# Patient Record
Sex: MALE | Race: WHITE | Employment: OTHER | URBAN - METROPOLITAN AREA
[De-identification: names, ages, dates, MRNs, and addresses within clinical notes are randomized per-mention and may not be internally consistent; named-entity substitution may affect disease eponyms.]

---
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
## 2017-03-16 LAB
ALBUMIN SERPL-MCNC: NORMAL G/DL
ALP BLD-CCNC: NORMAL U/L
ALT SERPL-CCNC: NORMAL U/L
ANION GAP SERPL CALCULATED.3IONS-SCNC: NORMAL MMOL/L
AST SERPL-CCNC: NORMAL U/L
BILIRUB SERPL-MCNC: NORMAL MG/DL
BUN BLDV-MCNC: NORMAL MG/DL
CALCIUM SERPL-MCNC: NORMAL MG/DL
CHLORIDE BLD-SCNC: NORMAL MMOL/L
CHOLESTEROL, TOTAL: NORMAL
CHOLESTEROL/HDL RATIO: NORMAL
CO2: NORMAL
CREAT SERPL-MCNC: NORMAL MG/DL
GFR CALCULATED: NORMAL
GLUCOSE BLD-MCNC: NORMAL MG/DL
HDLC SERPL-MCNC: NORMAL MG/DL
LDL CHOLESTEROL CALCULATED: NORMAL
POTASSIUM SERPL-SCNC: NORMAL MMOL/L
SODIUM BLD-SCNC: NORMAL MMOL/L
TOTAL PROTEIN: NORMAL
TRIGL SERPL-MCNC: NORMAL MG/DL
VLDLC SERPL CALC-MCNC: NORMAL MG/DL

## 2019-05-13 RX ORDER — ESCITALOPRAM OXALATE 5 MG/1
5 TABLET ORAL
Refills: 0 | COMMUNITY
Start: 2019-04-13 | End: 2019-05-13 | Stop reason: SDUPTHER

## 2019-05-15 RX ORDER — ESCITALOPRAM OXALATE 5 MG/1
5 TABLET ORAL DAILY
Qty: 30 TABLET | Refills: 5 | Status: SHIPPED | OUTPATIENT
Start: 2019-05-15 | End: 2019-05-23 | Stop reason: SDUPTHER

## 2019-05-23 ENCOUNTER — OFFICE VISIT (OUTPATIENT)
Dept: PRIMARY CARE CLINIC | Age: 24
End: 2019-05-23
Payer: COMMERCIAL

## 2019-05-23 ENCOUNTER — TELEPHONE (OUTPATIENT)
Dept: PRIMARY CARE CLINIC | Age: 24
End: 2019-05-23

## 2019-05-23 VITALS
BODY MASS INDEX: 25.06 KG/M2 | HEIGHT: 71 IN | SYSTOLIC BLOOD PRESSURE: 126 MMHG | TEMPERATURE: 98.2 F | DIASTOLIC BLOOD PRESSURE: 78 MMHG | WEIGHT: 179 LBS

## 2019-05-23 DIAGNOSIS — F41.9 ANXIETY: ICD-10-CM

## 2019-05-23 DIAGNOSIS — Z00.01 ENCOUNTER FOR ANNUAL GENERAL MEDICAL EXAMINATION WITH ABNORMAL FINDINGS IN ADULT: Primary | ICD-10-CM

## 2019-05-23 PROCEDURE — 99395 PREV VISIT EST AGE 18-39: CPT | Performed by: FAMILY MEDICINE

## 2019-05-23 RX ORDER — ESCITALOPRAM OXALATE 5 MG/1
5 TABLET ORAL DAILY
Qty: 90 TABLET | Refills: 5 | Status: SHIPPED
Start: 2019-05-23 | End: 2020-03-20

## 2019-05-23 RX ORDER — ESCITALOPRAM OXALATE 5 MG
5 TABLET ORAL DAILY
Qty: 90 TABLET | Refills: 12 | Status: SHIPPED
Start: 2019-05-23 | End: 2020-03-20

## 2019-05-23 ASSESSMENT — ENCOUNTER SYMPTOMS
EYES NEGATIVE: 1
RESPIRATORY NEGATIVE: 1
ALLERGIC/IMMUNOLOGIC NEGATIVE: 1
GASTROINTESTINAL NEGATIVE: 1

## 2019-05-23 NOTE — TELEPHONE ENCOUNTER
Pharmacy called script for Macarena Pompa was sent over today. They are asking if you ment to send GIGI? If so pts copay is $981    escitalopram was sent over on 5/15/19 and recently picked up.     Please advise

## 2019-05-23 NOTE — PROGRESS NOTES
19  Name: Loy Councilman    : 1995    Sex: male    Age: 21 y.o. Subjective:  Chief Complaint: Patient is here for anxiety     Feel ok   Much   Better   With med  Seeing   Counselor nettie dyer    Taking  [employer] job  Will be licnesed soon----  No cp or sob     Ros neg------ not drinking any etoh   No  GF now        Review of Systems   Constitutional: Negative. HENT: Negative. Eyes: Negative. Respiratory: Negative. Cardiovascular: Negative. Gastrointestinal: Negative. Endocrine: Negative. Genitourinary: Negative. Musculoskeletal: Negative. Skin: Negative. Allergic/Immunologic: Negative. Neurological: Negative. Hematological: Negative. Psychiatric/Behavioral: Negative.           Current Outpatient Medications:     LEXAPRO 5 MG tablet, Take 1 tablet by mouth daily, Disp: 90 tablet, Rfl: 12    escitalopram (LEXAPRO) 5 MG tablet, Take 1 tablet by mouth daily 1 po qd, Disp: 30 tablet, Rfl: 5  Allergies not on file  Social History     Socioeconomic History    Marital status: Single     Spouse name: Not on file    Number of children: Not on file    Years of education: Not on file    Highest education level: Not on file   Occupational History    Not on file   Social Needs    Financial resource strain: Not on file    Food insecurity:     Worry: Not on file     Inability: Not on file    Transportation needs:     Medical: Not on file     Non-medical: Not on file   Tobacco Use    Smoking status: Not on file   Substance and Sexual Activity    Alcohol use: Not on file    Drug use: Not on file    Sexual activity: Not on file   Lifestyle    Physical activity:     Days per week: Not on file     Minutes per session: Not on file    Stress: Not on file   Relationships    Social connections:     Talks on phone: Not on file     Gets together: Not on file     Attends Temple service: Not on file     Active member of club or organization: Not on file Attends meetings of clubs or organizations: Not on file     Relationship status: Not on file    Intimate partner violence:     Fear of current or ex partner: Not on file     Emotionally abused: Not on file     Physically abused: Not on file     Forced sexual activity: Not on file   Other Topics Concern    Not on file   Social History Narrative        CC: HERE FOR STRESS DERPESSION    HPI: GF [id number]---- BROKE OFF AGAIN--- ALMOS DONE    WTH TRAIN ING---- HE CALLED FOR THERPAIST AND SAW ONE FEW D AGO MALE    HELPED KATHLEEN---GAVE SOME STUFF OT DO--- SEES BACK IN OEN MO-----VINCE [address] FOR [employer] BUSINESS---REAL ESTATE APPRAISAL    PE FOR SCHOOL 2012 [address] [place] S AB POS IN 2015    ELEV LFT 7-15 DUE TO ETOH    ANX WITH GF BREAK UP    11-18    COUNSELOR AND  START LEXAPRO      No past medical history on file. No family history on file. No past surgical history on file. Vitals:    05/23/19 0724   BP: 126/78   Temp: 98.2 °F (36.8 °C)   Weight: 179 lb (81.2 kg)   Height: 5' 11\" (1.803 m)       Objective:    Physical Exam   Constitutional: He is oriented to person, place, and time. He appears well-developed and well-nourished. HENT:   Head: Normocephalic. Eyes: Pupils are equal, round, and reactive to light. EOM are normal.   Neck: Normal range of motion. Cardiovascular: Normal rate and regular rhythm. Pulmonary/Chest: Effort normal and breath sounds normal.   Abdominal: Soft. Musculoskeletal: Normal range of motion. Neurological: He is alert and oriented to person, place, and time. Skin: Skin is warm. Psychiatric: He has a normal mood and affect. His behavior is normal.   Vitals reviewed. Diagnoses and all orders for this visit:    Encounter for annual general medical examination with abnormal findings in adult    Anxiety  -     LEXAPRO 5 MG tablet;  Take 1 tablet by mouth daily        Comments: refill  Med  FU with psych  Hs asked about  RF   Told to  Caution       Ros neg    A great deal of time spent reviewing medications, diet, exercise, social issues. Also reviewing the chart before entering the room with patient and finishing charting after leaving patient's room. More than half of that time was spent face to face with the patient in counseling and coordinating care. Follow Up: Return if symptoms worsen or fail to improve.      Seen by:  Ryan Phelps, DO

## 2020-03-16 ENCOUNTER — OFFICE VISIT (OUTPATIENT)
Dept: PRIMARY CARE CLINIC | Age: 25
End: 2020-03-16
Payer: COMMERCIAL

## 2020-03-16 VITALS
DIASTOLIC BLOOD PRESSURE: 78 MMHG | WEIGHT: 190 LBS | HEART RATE: 87 BPM | SYSTOLIC BLOOD PRESSURE: 126 MMHG | BODY MASS INDEX: 26.5 KG/M2 | TEMPERATURE: 98.4 F | OXYGEN SATURATION: 97 %

## 2020-03-16 LAB — S PYO AG THROAT QL: NORMAL

## 2020-03-16 PROCEDURE — 87880 STREP A ASSAY W/OPTIC: CPT | Performed by: FAMILY MEDICINE

## 2020-03-16 PROCEDURE — 99213 OFFICE O/P EST LOW 20 MIN: CPT | Performed by: FAMILY MEDICINE

## 2020-03-16 RX ORDER — AMOXICILLIN 500 MG/1
500 CAPSULE ORAL 3 TIMES DAILY
Qty: 30 CAPSULE | Refills: 0 | Status: SHIPPED
Start: 2020-03-16 | End: 2022-08-10 | Stop reason: ALTCHOICE

## 2020-03-16 ASSESSMENT — PATIENT HEALTH QUESTIONNAIRE - PHQ9
2. FEELING DOWN, DEPRESSED OR HOPELESS: 0
SUM OF ALL RESPONSES TO PHQ QUESTIONS 1-9: 0
SUM OF ALL RESPONSES TO PHQ9 QUESTIONS 1 & 2: 0
SUM OF ALL RESPONSES TO PHQ QUESTIONS 1-9: 0
1. LITTLE INTEREST OR PLEASURE IN DOING THINGS: 0

## 2020-03-16 ASSESSMENT — ENCOUNTER SYMPTOMS
SINUS PRESSURE: 1
TROUBLE SWALLOWING: 0
RESPIRATORY NEGATIVE: 1
SORE THROAT: 0
SINUS PAIN: 1

## 2020-03-20 ENCOUNTER — HOSPITAL ENCOUNTER (OUTPATIENT)
Dept: PSYCHIATRY | Age: 25
Setting detail: THERAPIES SERIES
Discharge: HOME | End: 2020-03-20
Payer: COMMERCIAL

## 2020-03-20 PROCEDURE — 90791 PSYCH DIAGNOSTIC EVALUATION: CPT | Performed by: COUNSELOR

## 2020-03-20 ASSESSMENT — LIFESTYLE VARIABLES: HISTORY_ALCOHOL_USE: YES

## 2020-03-25 ENCOUNTER — HOSPITAL ENCOUNTER (OUTPATIENT)
Dept: PSYCHIATRY | Age: 25
Setting detail: THERAPIES SERIES
Discharge: HOME | End: 2020-03-25
Payer: COMMERCIAL

## 2020-03-25 NOTE — VIRTUAL HEALTH
Client Location: Client's home     [address] [place] [address] [address]    Provider Location (City/State): [place]     This virtual visit was conducted via interactive/real-time audio/video. Placed phone call to client for scheduled 9:30am counseling session. Client was pleasant and participated fully in session. Client discussed history of struggles with alcohol use/drug use and the situation that occurred that prompted engagement in our program. Client does not report hx of inpt/out AOD tx. Client reports that he has a history of misuse - primarily alcohol - with times of sobriety/non problematic use and then escalation of use. He reports the most recent drinking event ended in an arrest and is now linked with [facility] who is ordering him to engage in treatment with possibility of charges being expunged. Client reports that he also has a hx of anxiety and has had medication for this in the past but does not with to be prescribed anxiety medication. He feels he does not need medication and would like to get to the root of the problem as to why he would drink excessively. In addition to the anxiety reports, he also reports stressors of not having a career that he feels passionate about. He reports that often times when he would begin drinking, he would tell himself he has nothing to lose and would continue. 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 He reports that working helps him deal with idle time that would create thoughts of using. He reports that he graduated with a degree in [address] [place], [place] [id number] and had hopes to become a writer. He graduated in 2016 from college. Encouraged him to reach out to mentors/previous professors to see if they have any recommendations of how to get started. Cl reports that he has a strong support system that is positive and supportive of him during this time.  He reports that his mother's father was an alcoholic and she is aware of these situations. He reports his family, friends, and girlfriend are all people that he can talk to if needed. Cl reports that he is not familiar with recovery meetings in our community. Educated him on the meetings and the benefit and reported that meetings are being offered virtually. Encouraged cl to look into this resource. Will e-mail him the resource list to reported e-mail: [email or web address]. Client has a goal to complete treatment and \"figure out why I drink like this\".      Client's next session is scheduled for 3/31/2020 @ 1:00 pm    Electronically signed by Edda Chopra on 3/25/2020 at 10:36 AM

## 2020-04-03 ENCOUNTER — HOSPITAL ENCOUNTER (OUTPATIENT)
Dept: PSYCHIATRY | Age: 25
Setting detail: THERAPIES SERIES
Discharge: HOME | End: 2020-04-03
Payer: COMMERCIAL

## 2020-04-03 PROCEDURE — 99203 OFFICE O/P NEW LOW 30 MIN: CPT | Performed by: PSYCHIATRY & NEUROLOGY

## 2020-04-03 NOTE — BH NOTE
graduated college from [employer]. 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 MENTAL STATUS EXAM: White male appears age. Pleasant, cooperative, forthcoming. Normal psychomotor activity, gait, strength, tone, eye contact. Mood euthymic. Affect flexible. Speech clear. Thoughts organized. Content future-oriented. No suicidal or homicidal ideations. No paranoia, delusions or hallucinations. Orientation, concentration, recent and remote memory are grossly intact. Fund of knowledge fair. Language use fair. Insight and judgment fair. URINE TOXICOLOGY RESULTS: Not done    MEDICATIONS:  None     ASSESSMENT:  Alcohol Use Disorder moderate     Anxiety Disorder unspecified    PLAN: Admit to [facility]. Treatment will include group therapy, relapse prevention, case management and pharmacotherapy. No indication to start any medication at this time. OARRS report reviewed. Discuss with team. Speak with patient again in 2-3 weeks.     Signed:  Electronically signed by Juan Antony MD on 4/3/2020 at 1:20 PM

## 2020-04-07 ENCOUNTER — HOSPITAL ENCOUNTER (OUTPATIENT)
Dept: PSYCHIATRY | Age: 25
Setting detail: THERAPIES SERIES
Discharge: HOME | End: 2020-04-07
Payer: COMMERCIAL

## 2020-04-07 NOTE — VIRTUAL HEALTH
Consults  Patient Location: Pt's home address listed in chart  [address]    Provider Location ([facility]/State): [place], [place]    This virtual visit was conducted via interactive/real-time audio/video. SW spoke with pt over the phone for check-in. Pt reported he is \"doing good\" and has had \"no urges to drink\". Pt reported that he is keeping himself busy by working two jobs and only having 1 day off per week. Encouraged pt to participate in self-care activities and allow time for himself. Pt inquired about meeting program requirements/[facility] requirements due to limited treatment due to Deepaaj. SW unable to provide pt with appropriate answer. Pt gave sw permission to speak with his [facility]  Jose Luis Melgar [phone]. [facility] for Danay to return phone call to ensure pt is engaging in treatment plan. SW will contact pt once information is received to follow-up.

## 2020-04-08 ENCOUNTER — HOSPITAL ENCOUNTER (OUTPATIENT)
Dept: PSYCHIATRY | Age: 25
Setting detail: THERAPIES SERIES
Discharge: HOME | End: 2020-04-08
Payer: COMMERCIAL

## 2020-04-15 ENCOUNTER — HOSPITAL ENCOUNTER (OUTPATIENT)
Dept: PSYCHIATRY | Age: 25
Setting detail: THERAPIES SERIES
Discharge: HOME | End: 2020-04-15
Payer: COMMERCIAL

## 2020-04-15 NOTE — VIRTUAL HEALTH
Patient Location: Pt home     Provider Location ([facility]/State): [facility]     This virtual visit was conducted via interactive/real-time audio/video. Pt has verbally consented to tele health sessions. Date: 4/15/2020  Start time: 1:00pm  End time: 1:15pm     Patient's Goal: \"figure out why I drink like this\"    Session Notes: Spoke to Christiana Jay for scheduled 1pm session. Christiana Jay reports doing well overall. He shared his current activities that he engages in to deter him from use. He reports that he is working, hangs out with his girlfriend, visits his family, goes for walks, reads and writes. 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 Christiana Jay denies any triggers or urges to use and reports decrease in anxiety sx. Christiana Jay did share that he is in the process of finding out whether or not he will be ordered to drug court to complete the program and get charges expunged. Christiana Jay is motivated and is willing to do what he needs to do to complete the program and begin promoting a healthier lifestyle. Christiana Jay is scheduled for a telehealth session next week 4.22 at 1pm    Status After Intervention:  Improved    Participation Level: Active Listener and Interactive    Participation Quality: Appropriate, Attentive and Sharing    Speech:  Normal rate and tone    Thought Process/Content: Logical    Level of consciousness:  Alert and Oriented x4    Response to Learning: Able to verbalize current knowledge/experience, Able to retain information and Capable of insight    Endings: None Reported    Discipline Responsible: /Counselor     Session  15 min.  or less Non billable

## 2020-04-22 ENCOUNTER — HOSPITAL ENCOUNTER (OUTPATIENT)
Dept: PSYCHIATRY | Age: 25
Setting detail: THERAPIES SERIES
Discharge: HOME | End: 2020-04-22
Payer: COMMERCIAL

## 2020-04-29 ENCOUNTER — HOSPITAL ENCOUNTER (OUTPATIENT)
Dept: PSYCHIATRY | Age: 25
Setting detail: THERAPIES SERIES
Discharge: HOME | End: 2020-04-29
Payer: COMMERCIAL

## 2020-04-29 NOTE — PLAN OF CARE
[facility]- Level of Care Placement      [x]Admissions  []Continued Stay []Discharge/Transfer / Complication in [place] Date: 03/20/2020    Hanh Sethi      Level of Care Level 1      Outpatient Services Level 2.1   Intensive Outpatient Services(IOP) Level 2.5   Partial Hospitalization Services Level 3.1 CLINICALLY Managed Low-Intensity Residential Services Level 3.3  CLINICALLY Managed Population- Specific High- Intensity Residential Services Level 3.5  CLINICALLY Managed High Intensive Residential Services Level 3.7  MEDICALLY Monitored Intensive Inpatient Services Level 4  MEDICALLY Managed Intensive Inpatient Services   Dimension 1  Acute Intoxication and/or Withdrawal Potential [x] Not experiencing significant withdrawal    [] Minimal  risk of severe  withdrawal [] Minimal  risk of severe  withdrawal    [] Manageable  at Level 2-WM [] Moderate  risk of severe withdrawal    [] Manageable at Level 2-WM [] No withdrawal risk or minimal or stable withdrawal     [] Concurrently receiving Level -WM or Level 2-WM services [] Minimal risk of severe withdrawal    [] If withdrawal is present, manageable at Level 3. 2-WM  [] Minimal risk of severe withdrawal    [] If withdrawal is present manageable at Level 3. 2-WM [] High risk of withdrawal, but manageable at Level  3.7-WM and does not require  full resources of a licensed hospital [] At high risk of withdrawal and requires Level 4-WM and full resources of licensed hospital    COMMENTS:           Dimension 2   Biomedical Conditions and Complications  (BMC/C) [x] None or very stable    [] Receiving concurrent medical monitoring  [] None or not a distraction from treatment    [] Problems are manageable at Level 2.1 [] None or not sufficient to distract from treatment    [] Problems are manageable at  Level 2.5 [] None or stable    [] Receiving concurrent medical monitoring  [] None or stable    [] Receiving concurrent medical Osmin      Level of Care Level 1  Outpatient   Services Level 2.1  Intensive  Outpatient  Services Level 2.5  Partial Hospitalization Services Level 3.1  CLINICALLY Managed Low-intensity Residential Services Level 3.3  CLINICALLY Managed Population- Specific High- Intensity Residential Services Level 3.5  CLINICALLY Managed High-Intensive Residential Services Level 3.7  MEDICALLY Monitored Intensive Inpatient Services Level 4  MEDICALLY Managed Intensive Inpatient Services   Dimension 4  Readiness  To  Change [] Ready for recovery but needs motivating and monitoring strategies to strengthen readiness    []Needs ongoing monitoring and disease management    [x] High severity in this dimension but not in other dimensions. Needs Level 1 motivational enhancement strategies   [] Has variable engagement in treatment, ambivalence, or lack of awareness of substance use or mental health problems and requires structured program several times/wk. to promote progress through stages of change [] Has poor engagement in treatment, significant ambivalence, or lack of awareness of substance use or mental health problems, requires near daily structured program or intensive engagement to promote progress through stages of change [] Open to recovery, but needs structured environment to maintain therapeutic gains [] Has little awareness & needs interventions at Level 3.3 to engage & stay in treatment.     [] If there is high severity in this dimension, but not in any other dimension, motivational enhancement strategies should be provided in Level 1 [] Has marked difficulty with, or opposition to treatment with dangerous consequences    [] If there is high severity in this dimension, but not in any other dimension, motivational enhancement strategies should be provided in Level 1 [] Low interest in treatment and impulse control is poor despite negative consequences;  needs motivating strategies only safely available in 24-hour structured is not supportive  but with structure and support, the client can cope [] Recovery environment is not supportive, but with structure and support and relief from the home environment, client can cope [] Environment    is dangerous, but recovery is achievable if Level 3.1 24-hour structure is available  [] Environment is dangerous and  client needs 24-hour structure to learn to cope [] Environment is dangerous, and the client lacks skills to cope outside of a  highly structured 24-hour setting   [] Environment is dangerous, and the client lacks skills to cope outside of a  highly structured 24-hour setting [] Problems in this dimension do not qualify the client for Level 4 services    [] If the client's only severity is in Dimension 4,5, and/or 6 without high severity in Dimensions 1,2, and/or 3, then client is not qualified for Level 4   COMMENTS:             Staff: Electronically signed by Lilia Treviño, [address]

## 2020-05-06 ENCOUNTER — HOSPITAL ENCOUNTER (OUTPATIENT)
Dept: PSYCHIATRY | Age: 25
Setting detail: THERAPIES SERIES
Discharge: HOME | End: 2020-05-06
Payer: COMMERCIAL

## 2020-05-06 ASSESSMENT — PATIENT HEALTH QUESTIONNAIRE - PHQ9: SUM OF ALL RESPONSES TO PHQ QUESTIONS 1-9: 0

## 2020-05-08 ENCOUNTER — HOSPITAL ENCOUNTER (OUTPATIENT)
Dept: PSYCHIATRY | Age: 25
Setting detail: THERAPIES SERIES
Discharge: HOME | End: 2020-05-08
Payer: COMMERCIAL

## 2020-05-08 NOTE — BH NOTE
PSYCHIATRY ATTENDING NOTE    CC: \"Really good. \"    S: Patient being seen at [facility] in follow-up for alcohol use and anxiety. Spoke with patient over telephone today due to coronavirus precautions. Ashley Garcia continues to report he is doing very well. No further alcohol use. He is comfortable with discharge. No issues identified. MSE: Pleasant, cooperative, forthcoming. Mood euthymic. Speech clear. Thoughts organized. Content future-oriented. No suicidal or homicidal ideations. No paranoia, delusions or hallucinations. Orientation, concentration, recent and remote memory are grossly intact. Fund of knowledge fair. Language use fair. Insight and judgment fair. MEDICATIONS: None    ASSESSMENT:  Alcohol Use Disorder moderate                                      Anxiety Disorder unspecified    PLAN: Discharge from [facility].                           Electronically signed by Bandar Stevens MD on 5/8/2020 at 11:55 AM

## 2020-06-01 ENCOUNTER — SOCIAL WORK (OUTPATIENT)
Dept: PSYCHIATRY | Age: 25
End: 2020-06-01

## 2020-08-26 ENCOUNTER — OFFICE VISIT (OUTPATIENT)
Dept: PRIMARY CARE CLINIC | Age: 25
End: 2020-08-26
Payer: COMMERCIAL

## 2020-08-26 VITALS
BODY MASS INDEX: 28 KG/M2 | TEMPERATURE: 98.4 F | DIASTOLIC BLOOD PRESSURE: 78 MMHG | HEIGHT: 71 IN | WEIGHT: 200 LBS | SYSTOLIC BLOOD PRESSURE: 126 MMHG

## 2020-08-26 PROCEDURE — 99213 OFFICE O/P EST LOW 20 MIN: CPT | Performed by: FAMILY MEDICINE

## 2020-08-26 PROCEDURE — 1036F TOBACCO NON-USER: CPT | Performed by: FAMILY MEDICINE

## 2020-08-26 PROCEDURE — G8419 CALC BMI OUT NRM PARAM NOF/U: HCPCS | Performed by: FAMILY MEDICINE

## 2020-08-26 PROCEDURE — G8428 CUR MEDS NOT DOCUMENT: HCPCS | Performed by: FAMILY MEDICINE

## 2020-08-26 ASSESSMENT — ENCOUNTER SYMPTOMS
GASTROINTESTINAL NEGATIVE: 1
ALLERGIC/IMMUNOLOGIC NEGATIVE: 1
RESPIRATORY NEGATIVE: 1
EYES NEGATIVE: 1

## 2020-08-26 ASSESSMENT — PATIENT HEALTH QUESTIONNAIRE - PHQ9
SUM OF ALL RESPONSES TO PHQ QUESTIONS 1-9: 0
SUM OF ALL RESPONSES TO PHQ QUESTIONS 1-9: 0
2. FEELING DOWN, DEPRESSED OR HOPELESS: 0
1. LITTLE INTEREST OR PLEASURE IN DOING THINGS: 0
SUM OF ALL RESPONSES TO PHQ9 QUESTIONS 1 & 2: 0

## 2020-08-26 NOTE — PROGRESS NOTES
20  Name: Rhys Casillas    : 1995    Sex: male    Age: 31 y.o. Subjective:  Chief Complaint: Patient is here for lesion on arm     First noticed three weeks ago     Not itch   No pain      Review of Systems   Constitutional: Negative. HENT: Negative. Eyes: Negative. Respiratory: Negative. Cardiovascular: Negative. Gastrointestinal: Negative. Endocrine: Negative. Genitourinary: Negative. Musculoskeletal: Negative. Skin: Positive for rash (see  hpi). Allergic/Immunologic: Negative. Neurological: Negative. Hematological: Negative. Psychiatric/Behavioral: Negative. Current Outpatient Medications:     hydrocortisone 2.5 % cream, Apply topically 4  times daily. , Disp: 1 Tube, Rfl: 2    amoxicillin (AMOXIL) 500 MG capsule, Take 1 capsule by mouth 3 times daily, Disp: 30 capsule, Rfl: 0  No Known Allergies  Social History     Socioeconomic History    Marital status: Single     Spouse name: Not on file    Number of children: Not on file    Years of education: Not on file    Highest education level: Not on file   Occupational History    Not on file   Social Needs    Financial resource strain: Not on file    Food insecurity     Worry: Not on file     Inability: Not on file    Transportation needs     Medical: Not on file     Non-medical: Not on file   Tobacco Use    Smoking status: Never Smoker    Smokeless tobacco: Never Used   Substance and Sexual Activity    Alcohol use: Not on file    Drug use: Not on file    Sexual activity: Not on file   Lifestyle    Physical activity     Days per week: Not on file     Minutes per session: Not on file    Stress: Not on file   Relationships    Social connections     Talks on phone: Not on file     Gets together: Not on file     Attends Mandae[employer] service: Not on file     Active member of club or organization: Not on file     Attends meetings of clubs or organizations: Not on file     Relationship status: Not on file    Intimate partner violence     Fear of current or ex partner: Not on file     Emotionally abused: Not on file     Physically abused: Not on file     Forced sexual activity: Not on file   Other Topics Concern    Not on file   Social History Narrative        CC: HERE FOR STRESS DERPESSION    HPI: GF [id number]---- BROKE OFF AGAIN--- ALMOS DONE    WTH TRAIN ING---- HE CALLED FOR THERPAIST AND SAW ONE FEW D AGO MALE    HELPED ALITTLE---GAVE SOME STUFF OT DO--- SEES BACK IN OEN MO-----VINCE MORENO    COUNSELING--            [address] 70 FOR D[employer] BUSINESS---REAL ESTATE APPRAISAL    PE FOR SCHOOL 2012 WITH INTER HEPB S AB AND BOOSTER GIVEN AND S AB POS IN 2015    ELEV LFT 7-15 DUE TO ETOH    ANX WITH GF BREAK UP    11-18    COUNSELOR AND  START LEXAPRO---back with her    Works   At   [employer] it out      Rest  And  With dad  8-20      Past Medical History:   Diagnosis Date    Anxiety     Contusion, foot     Dysthymic disorder     LFT elevation 07/2015    Due to ETOH    Metatarsal bone fracture     closed    Pain in limb     Tinea versicolor      No family history on file. No past surgical history on file. Vitals:    08/26/20 0904   BP: 126/78   Temp: 98.4 °F (36.9 °C)   Weight: 200 lb (90.7 kg)   Height: 5' 11\" (1.803 m)       Objective:    Physical Exam  Vitals signs reviewed. Constitutional:       Appearance: He is well-developed. HENT:      Head: Normocephalic. Eyes:      Pupils: Pupils are equal, round, and reactive to light. Neck:      Musculoskeletal: Normal range of motion. Cardiovascular:      Rate and Rhythm: Normal rate and regular rhythm. Pulmonary:      Effort: Pulmonary effort is normal.      Breath sounds: Normal breath sounds. Abdominal:      Palpations: Abdomen is soft. Musculoskeletal: Normal range of motion.    Skin:     Comments: Left antr forearm with one cm  Christopher  Lesion with no dc   Neurological:      Mental Status: He is

## 2022-08-10 ENCOUNTER — OFFICE VISIT (OUTPATIENT)
Dept: FAMILY MEDICINE CLINIC | Age: 27
End: 2022-08-10
Payer: COMMERCIAL

## 2022-08-10 VITALS
BODY MASS INDEX: 27.58 KG/M2 | TEMPERATURE: 98.5 F | HEART RATE: 86 BPM | OXYGEN SATURATION: 98 % | DIASTOLIC BLOOD PRESSURE: 72 MMHG | RESPIRATION RATE: 18 BRPM | WEIGHT: 197 LBS | SYSTOLIC BLOOD PRESSURE: 116 MMHG | HEIGHT: 71 IN

## 2022-08-10 DIAGNOSIS — Z72.51 HIGH RISK HETEROSEXUAL BEHAVIOR: ICD-10-CM

## 2022-08-10 DIAGNOSIS — Z20.2 EXPOSURE TO STD: ICD-10-CM

## 2022-08-10 DIAGNOSIS — R30.0 DYSURIA: Primary | ICD-10-CM

## 2022-08-10 LAB
BILIRUBIN, POC: NEGATIVE
BLOOD URINE, POC: NEGATIVE
CLARITY, POC: CLEAR
COLOR, POC: YELLOW
GLUCOSE URINE, POC: NEGATIVE
KETONES, POC: NORMAL
LEUKOCYTE EST, POC: NEGATIVE
NITRITE, POC: NEGATIVE
PH, POC: 7
PROTEIN, POC: NEGATIVE
SPECIFIC GRAVITY, POC: 1.02
UROBILINOGEN, POC: 1

## 2022-08-10 PROCEDURE — 99214 OFFICE O/P EST MOD 30 MIN: CPT | Performed by: NURSE PRACTITIONER

## 2022-08-10 PROCEDURE — 81002 URINALYSIS NONAUTO W/O SCOPE: CPT | Performed by: NURSE PRACTITIONER

## 2022-08-10 PROCEDURE — G8419 CALC BMI OUT NRM PARAM NOF/U: HCPCS | Performed by: NURSE PRACTITIONER

## 2022-08-10 PROCEDURE — 96372 THER/PROPH/DIAG INJ SC/IM: CPT | Performed by: NURSE PRACTITIONER

## 2022-08-10 PROCEDURE — G8427 DOCREV CUR MEDS BY ELIG CLIN: HCPCS | Performed by: NURSE PRACTITIONER

## 2022-08-10 PROCEDURE — 1036F TOBACCO NON-USER: CPT | Performed by: NURSE PRACTITIONER

## 2022-08-10 RX ORDER — CEFTRIAXONE 500 MG/1
500 INJECTION, POWDER, FOR SOLUTION INTRAMUSCULAR; INTRAVENOUS ONCE
Status: COMPLETED | OUTPATIENT
Start: 2022-08-10 | End: 2022-08-10

## 2022-08-10 RX ORDER — AZITHROMYCIN 500 MG/1
1000 TABLET, FILM COATED ORAL ONCE
Qty: 2 TABLET | Refills: 0 | Status: SHIPPED | OUTPATIENT
Start: 2022-08-10 | End: 2022-08-10

## 2022-08-10 RX ADMIN — CEFTRIAXONE 500 MG: 500 INJECTION, POWDER, FOR SOLUTION INTRAMUSCULAR; INTRAVENOUS at 15:49

## 2022-08-10 NOTE — PROGRESS NOTES
sounds equal.  Heart:  Regular rate and rhythm, normal heart sounds, without pathological murmurs, ectopy, gallops, or rubs. Abdomen:  Soft, nontender, good bowel sounds. No firm or pulsatile mass. Genitalia Exam:       Penis: No obvious lesions noted on shaft of penis. There are small bumps on the head of the penis. Scrotum: No tenderness or lesions noted. Testicle: Descended bilaterally. No masses noted. Skin:  Normal turgor. Warm, dry, without visible rash, unless noted elsewhere. Neurological:  Orientation age-appropriate. Motor functions intact. Test Results Section   (All laboratory and radiology results have been personally reviewed by myself)  Labs:  Results for orders placed or performed in visit on 08/10/22   POCT Urinalysis no Micro   Result Value Ref Range    Color, UA yellow     Clarity, UA clear     Glucose, UA POC negative     Bilirubin, UA negative     Ketones, UA 15mg     Spec Grav, UA 1.025     Blood, UA POC negative     pH, UA 7.0     Protein, UA POC negative     Urobilinogen, UA 1.0     Leukocytes, UA negative     Nitrite, UA negative      Assessment / Plan   Impression(s):  Joel Guido was seen today for dysuria. Diagnoses and all orders for this visit:    Dysuria  -     POCT Urinalysis no Micro    Exposure to STD  -     C.trachomatis N.gonorrhoeae DNA, Urine; Future  -     cefTRIAXone (ROCEPHIN) injection 500 mg  -     azithromycin (ZITHROMAX) 500 MG tablet; Take 2 tablets by mouth once for 1 dose  -     HERPES SIMPLEX VIRUS (HSV) I/II ANTIBODIES IGG & IGM W/ REFLEX; Future  -     HIV Screen; Future  -     RPR; Future    High risk heterosexual behavior  -     C.trachomatis N.gonorrhoeae DNA, Urine; Future  -     HERPES SIMPLEX VIRUS (HSV) I/II ANTIBODIES IGG & IGM W/ REFLEX; Future  -     HIV Screen; Future  -     RPR; Future    UA came back wnl in office. Urine also sent for GC/CT testing today. Labs obtained and pending, will call with results once available.  I am going to cover pt for GC/CT today with IM Rocephin, oral Zithromax sent to pharmacy, side effects discussed. Advise f/u with PCP if still symptomatic after 1 week. ED sooner if symptoms worsen or change. ED immediately with the development of fever, lethargy, pain with erection, abdominal pain, vomiting, or back pain. Pt states understanding and is in agreement with this care plan. All questions answered. Return if symptoms worsen or fail to improve. Electronically signed by PAVAN Metcalf CNP   DD: 8/10/22    **This report was transcribed using voice recognition software. Every effort was made to ensure accuracy; however, inadvertent computerized transcription errors may be present.

## 2022-08-15 LAB
C. TRACHOMATIS DNA ,URINE: NEGATIVE
N. GONORRHOEAE DNA, URINE: NEGATIVE
SOURCE: NORMAL

## 2023-01-04 ENCOUNTER — OFFICE VISIT (OUTPATIENT)
Dept: PRIMARY CARE CLINIC | Age: 28
End: 2023-01-04
Payer: COMMERCIAL

## 2023-01-04 VITALS
WEIGHT: 213 LBS | HEIGHT: 71 IN | BODY MASS INDEX: 29.82 KG/M2 | SYSTOLIC BLOOD PRESSURE: 128 MMHG | DIASTOLIC BLOOD PRESSURE: 78 MMHG | TEMPERATURE: 97.9 F

## 2023-01-04 DIAGNOSIS — L30.9 DERMATITIS: Primary | ICD-10-CM

## 2023-01-04 DIAGNOSIS — T78.40XA ALLERGY, INITIAL ENCOUNTER: ICD-10-CM

## 2023-01-04 PROCEDURE — G8419 CALC BMI OUT NRM PARAM NOF/U: HCPCS | Performed by: FAMILY MEDICINE

## 2023-01-04 PROCEDURE — G8482 FLU IMMUNIZE ORDER/ADMIN: HCPCS | Performed by: FAMILY MEDICINE

## 2023-01-04 PROCEDURE — 1036F TOBACCO NON-USER: CPT | Performed by: FAMILY MEDICINE

## 2023-01-04 PROCEDURE — G8428 CUR MEDS NOT DOCUMENT: HCPCS | Performed by: FAMILY MEDICINE

## 2023-01-04 PROCEDURE — 99213 OFFICE O/P EST LOW 20 MIN: CPT | Performed by: FAMILY MEDICINE

## 2023-01-04 RX ORDER — PREDNISONE 10 MG/1
TABLET ORAL
Qty: 18 TABLET | Refills: 0 | Status: SHIPPED | OUTPATIENT
Start: 2023-01-04

## 2023-01-04 RX ORDER — DOXYCYCLINE HYCLATE 100 MG
100 TABLET ORAL 2 TIMES DAILY
Qty: 20 TABLET | Refills: 0 | Status: SHIPPED | OUTPATIENT
Start: 2023-01-04 | End: 2023-01-14

## 2023-01-04 ASSESSMENT — PATIENT HEALTH QUESTIONNAIRE - PHQ9
1. LITTLE INTEREST OR PLEASURE IN DOING THINGS: 0
SUM OF ALL RESPONSES TO PHQ QUESTIONS 1-9: 0
SUM OF ALL RESPONSES TO PHQ9 QUESTIONS 1 & 2: 0
SUM OF ALL RESPONSES TO PHQ QUESTIONS 1-9: 0
2. FEELING DOWN, DEPRESSED OR HOPELESS: 0

## 2023-01-04 ASSESSMENT — ENCOUNTER SYMPTOMS
EYES NEGATIVE: 1
GASTROINTESTINAL NEGATIVE: 1
RESPIRATORY NEGATIVE: 1
ALLERGIC/IMMUNOLOGIC NEGATIVE: 1

## 2023-01-04 NOTE — PROGRESS NOTES
23  Name: Rakesh Mera    : 1995    Sex: male    Age: 28 y.o. Subjective:  Chief Complaint: Patient is here for  rash        Itchy rash bilat lower extrem  For a month      See dr Aj Dubois      Review of Systems   Constitutional: Negative. HENT: Negative. Eyes: Negative. Respiratory: Negative. Cardiovascular: Negative. Gastrointestinal: Negative. Endocrine: Negative. Genitourinary: Negative. Musculoskeletal: Negative. Skin:  Positive for rash. Allergic/Immunologic: Negative. Neurological: Negative. Hematological: Negative. Psychiatric/Behavioral: Negative. No current outpatient medications on file.   No Known Allergies  Social History     Socioeconomic History    Marital status: Single     Spouse name: Not on file    Number of children: Not on file    Years of education: Not on file    Highest education level: Not on file   Occupational History    Not on file   Tobacco Use    Smoking status: Never    Smokeless tobacco: Never   Vaping Use    Vaping Use: Never used   Substance and Sexual Activity    Alcohol use: Not on file    Drug use: Not on file    Sexual activity: Not on file   Other Topics Concern    Not on file   Social History Narrative        CC: HERE FOR STRESS DERPESSION    HPI: GF OF [facility] [address]---- BROKE OFF AGAIN--- NARESH DONE    WTH TRAIN ING---- HE CALLED FOR THERPAIST AND SAW ONE FEW D AGO MALE    HELPED ALITTLE---GAVE SOME STUFF OT DO--- SEES BACK IN N MO-----VINCE MORENO    COUNSELING--            [employer]  Quit        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    PE FOR SCHOOL  WITH INTER HEPB S AB AND BOOSTER GIVEN AND S AB POS IN     ELEV LFT 7-15 DUE TO ETOH    ANX WITH GF BREAK UP        COUNSELOR AND  START LEXAPRO---back with her    Works   At   [employer] it out      Rest  And  With dad  8-20     Social Determinants of Health     Financial Resource Strain: Not on [employer] Insecurity: Not on file   Transportation Needs: Not on file   Physical Activity: Not on file   Stress: Not on file   Social Connections: Not on file   Intimate Partner Violence: Not on file   Housing Stability: Not on file      Past Medical History:   Diagnosis Date    Anxiety     Contusion, foot     Dysthymic disorder     LFT elevation 07/2015    Due to ETOH    Metatarsal bone fracture     closed    Pain in limb     Tinea versicolor      No family history on file. No past surgical history on file. Vitals:    01/04/23 1251   BP: 128/78   Temp: 97.9 °F (36.6 °C)   TempSrc: Oral   Weight: 213 lb (96.6 kg)   Height: 5' 11\" (1.803 m)       Objective:    Physical Exam  Vitals reviewed. Constitutional:       Appearance: Normal appearance. He is well-developed. HENT:      Head: Normocephalic. Right Ear: Tympanic membrane normal.      Left Ear: Tympanic membrane normal.      Nose: Nose normal.      Mouth/Throat:      Mouth: Mucous membranes are moist.   Eyes:      Pupils: Pupils are equal, round, and reactive to light. Cardiovascular:      Rate and Rhythm: Normal rate and regular rhythm. Pulmonary:      Effort: Pulmonary effort is normal.      Breath sounds: Normal breath sounds. Abdominal:      General: Bowel sounds are normal.      Palpations: Abdomen is soft. Musculoskeletal:         General: Normal range of motion. Cervical back: Normal range of motion. Skin:     Comments: Ilat lwoer extrem patches wit   quinton    rigth lower   dry patch   Neurological:      Mental Status: He is alert and oriented to person, place, and time. Psychiatric:         Behavior: Behavior normal.       Angela Joseph was seen today for rash. Diagnoses and all orders for this visit:    Dermatitis    Allergy, initial encounter      Comments: meds  sees  derm on Monday    cpx  soon  lose wt  diet exer    I educated the patient about all medications.   Make sure they were correct and educated  on the risk associated with missing meds or taking them incorrectly. A list of medications is being sent home with patient today. Check blood pressure at home twice a day. Low-salt low caffeine diet. Call if systolic blood pressure is above 080 and diastolic blood pressures above 85. Only use a upper arm digital cuff. I informed patient about the risk associated with noncompliance of medication and taking medications incorrectly. Appropriate follow-up with myself and all specialist.  Encourage family members to take active role in assisting with medications and medical care. If any confusion should develop to notify my office immediately to avoid risk of worsening medical condition    A great deal of time spent reviewing medications, diet, exercise, social issues. Also reviewing the chart before entering the room with patient and finishing charting after leaving patient's room. More than half of that time was spent face to face with the patient in counseling and coordinating care. Follow Up: Return if symptoms worsen or fail to improve, for Reg Appt, Meds. If worse go to ER. Not better in 24 hr see.      Seen by:  Iban Jordan, DO

## 2024-02-08 ENCOUNTER — TELEMEDICINE (OUTPATIENT)
Dept: PRIMARY CARE CLINIC | Age: 29
End: 2024-02-08
Payer: COMMERCIAL

## 2024-02-08 DIAGNOSIS — U07.1 COVID-19: Primary | ICD-10-CM

## 2024-02-08 DIAGNOSIS — J01.80 ACUTE NON-RECURRENT SINUSITIS OF OTHER SINUS: ICD-10-CM

## 2024-02-08 PROCEDURE — G8428 CUR MEDS NOT DOCUMENT: HCPCS | Performed by: FAMILY MEDICINE

## 2024-02-08 PROCEDURE — G8421 BMI NOT CALCULATED: HCPCS | Performed by: FAMILY MEDICINE

## 2024-02-08 PROCEDURE — G8484 FLU IMMUNIZE NO ADMIN: HCPCS | Performed by: FAMILY MEDICINE

## 2024-02-08 PROCEDURE — 99213 OFFICE O/P EST LOW 20 MIN: CPT | Performed by: FAMILY MEDICINE

## 2024-02-08 PROCEDURE — 1036F TOBACCO NON-USER: CPT | Performed by: FAMILY MEDICINE

## 2024-02-08 RX ORDER — METHYLPREDNISOLONE 4 MG/1
TABLET ORAL
Qty: 1 KIT | Refills: 0 | Status: SHIPPED | OUTPATIENT
Start: 2024-02-08

## 2024-02-08 RX ORDER — AZITHROMYCIN 250 MG/1
250 TABLET, FILM COATED ORAL SEE ADMIN INSTRUCTIONS
Qty: 6 TABLET | Refills: 0 | Status: SHIPPED | OUTPATIENT
Start: 2024-02-08 | End: 2024-02-13

## 2024-02-08 ASSESSMENT — ENCOUNTER SYMPTOMS
RHINORRHEA: 1
ALLERGIC/IMMUNOLOGIC NEGATIVE: 1
GASTROINTESTINAL NEGATIVE: 1
EYES NEGATIVE: 1
COUGH: 1

## 2024-02-08 NOTE — PROGRESS NOTES
24  Name: Osmin Noe    : 1995    Sex: male    Age: 28 y.o.        Subjective:    eMedicine Video Visit    This visit was performed as a virtual video visit using a synchronous, two-way, audio-video telehealth technology platform. Patient identification was verified at the start of the visit, including the patient's telephone number and physical location. I discussed with the patient the nature of our telehealth visits, that:     Due to the nature of an audio- video modality, the only components of a physical exam that could be done are the elements supported by direct observation.  I would evaluate the patient and recommend diagnostics and treatments based on my assessment.  If it was felt that the patient should be evaluated in clinic or an emergency room setting, then they would be directed there.  Our sessions are not being recorded and that personal health information is protected.  Our team would provide follow up care in person if/when the patient needs it.       Patient does agree to proceed with telemedicine consultation.    Patient's location: home address in [place]  Physician  location home address in [place] other people involved in call [facility]      Time spent: Greater than Not billed by time    This visit was completed virtually using [email or web address]       Patient has requested an audio/video evaluation for the following concern(s):    Cough song  sinus  st   tired  achy   ch  no t   no cp rso luis    Pt    baby and  gf    coivd  he test    pos      today         Review of Systems   Constitutional: Negative.    HENT:  Positive for rhinorrhea.    Eyes: Negative.    Respiratory:  Positive for cough.    Cardiovascular: Negative.    Gastrointestinal: Negative.    Endocrine: Negative.    Genitourinary: Negative.    Musculoskeletal: Negative.    Skin: Negative.    Allergic/Immunologic: Negative.    Neurological: Negative.    Hematological: Negative.    Psychiatric/Behavioral: Negative.